# Patient Record
Sex: MALE | Race: WHITE | URBAN - METROPOLITAN AREA
[De-identification: names, ages, dates, MRNs, and addresses within clinical notes are randomized per-mention and may not be internally consistent; named-entity substitution may affect disease eponyms.]

---

## 2020-07-10 ENCOUNTER — OUT OF OFFICE VISIT (OUTPATIENT)
Dept: URBAN - METROPOLITAN AREA MEDICAL CENTER 23 | Facility: MEDICAL CENTER | Age: 75
End: 2020-07-10
Payer: MEDICARE

## 2020-07-10 DIAGNOSIS — D62 ACUTE BLOOD LOSS ANEMIA: ICD-10-CM

## 2020-07-10 DIAGNOSIS — I26.99 PULMONARY EMBOLUS: ICD-10-CM

## 2020-07-10 DIAGNOSIS — Z79.01 ANTICOAGULANT LONG-TERM USE: ICD-10-CM

## 2020-07-10 DIAGNOSIS — K92.1 BLACK STOOL: ICD-10-CM

## 2020-07-10 PROCEDURE — 99222 1ST HOSP IP/OBS MODERATE 55: CPT | Performed by: INTERNAL MEDICINE

## 2020-07-10 PROCEDURE — G8427 DOCREV CUR MEDS BY ELIG CLIN: HCPCS | Performed by: INTERNAL MEDICINE

## 2020-07-11 ENCOUNTER — OUT OF OFFICE VISIT (OUTPATIENT)
Dept: URBAN - METROPOLITAN AREA MEDICAL CENTER 23 | Facility: MEDICAL CENTER | Age: 75
End: 2020-07-11
Payer: MEDICARE

## 2020-07-11 DIAGNOSIS — K92.1 BLACK STOOL: ICD-10-CM

## 2020-07-11 DIAGNOSIS — K55.9 ACUTE ISCHEMIC COLITIS IMPROVING: ICD-10-CM

## 2020-07-11 DIAGNOSIS — D64.89 ANEMIA DUE TO OTHER CAUSE: ICD-10-CM

## 2020-07-11 PROCEDURE — 45380 COLONOSCOPY AND BIOPSY: CPT | Performed by: INTERNAL MEDICINE

## 2020-07-11 PROCEDURE — G9937 DIG OR SURV COLSCO: HCPCS | Performed by: INTERNAL MEDICINE

## 2020-07-11 PROCEDURE — 43235 EGD DIAGNOSTIC BRUSH WASH: CPT | Performed by: INTERNAL MEDICINE

## 2020-07-22 ENCOUNTER — WEB ENCOUNTER (OUTPATIENT)
Dept: URBAN - METROPOLITAN AREA CLINIC 54 | Facility: CLINIC | Age: 75
End: 2020-07-22

## 2020-07-22 ENCOUNTER — OFFICE VISIT (OUTPATIENT)
Dept: URBAN - METROPOLITAN AREA CLINIC 54 | Facility: CLINIC | Age: 75
End: 2020-07-22
Payer: MEDICARE

## 2020-07-22 DIAGNOSIS — Q27.30 AVM (ARTERIOVENOUS MALFORMATION): ICD-10-CM

## 2020-07-22 DIAGNOSIS — K64.9 HEMORRHOIDS: ICD-10-CM

## 2020-07-22 DIAGNOSIS — I26.99 PULMONARY EMBOLISM: ICD-10-CM

## 2020-07-22 DIAGNOSIS — Q25.3 AORTIC STENOSIS: ICD-10-CM

## 2020-07-22 DIAGNOSIS — I25.9 CAD (CORONARY ARTERY DISEASE): ICD-10-CM

## 2020-07-22 DIAGNOSIS — I50.9 CHF (CONGESTIVE HEART FAILURE): ICD-10-CM

## 2020-07-22 DIAGNOSIS — K55.9 ISCHEMIC COLITIS: ICD-10-CM

## 2020-07-22 DIAGNOSIS — D50.9 IRON DEFICIENCY ANEMIA: ICD-10-CM

## 2020-07-22 DIAGNOSIS — K92.1 MELENA: ICD-10-CM

## 2020-07-22 DIAGNOSIS — K57.90 DIVERTICULOSIS: ICD-10-CM

## 2020-07-22 PROCEDURE — 99214 OFFICE O/P EST MOD 30 MIN: CPT | Performed by: INTERNAL MEDICINE

## 2020-07-22 RX ORDER — ATORVASTATIN CALCIUM 40 MG/1
1 TABLET TABLET, FILM COATED ORAL ONCE A DAY
Status: ACTIVE | COMMUNITY

## 2020-07-22 RX ORDER — PANTOPRAZOLE SODIUM 40 MG/1
1 TABLET TABLET, DELAYED RELEASE ORAL ONCE A DAY
Status: ACTIVE | COMMUNITY

## 2020-07-22 RX ORDER — SIMVASTATIN 10 MG/1
TAKE 1 TABLET (10 MG) BY ORAL ROUTE ONCE DAILY IN THE EVENING TABLET, FILM COATED ORAL 1
Qty: 0 | Refills: 0 | Status: DISCONTINUED | COMMUNITY
Start: 1900-01-01

## 2020-07-22 RX ORDER — FERRIC CARBOXYMALTOSE INJECTION 50 MG/ML
AS DIRECTED INJECTION, SOLUTION INTRAVENOUS
Qty: 1 | Refills: 0 | OUTPATIENT
Start: 2020-07-22

## 2020-07-22 RX ORDER — CARVEDILOL 3.12 MG/1
1 TABLET WITH FOOD TABLET, FILM COATED ORAL TWICE A DAY
Status: ACTIVE | COMMUNITY

## 2020-07-22 RX ORDER — APIXABAN 5 MG/1
AS DIRECTED TABLET, FILM COATED ORAL
Status: ACTIVE | COMMUNITY

## 2020-07-22 NOTE — HPI-TODAY'S VISIT:
This is a 74-year-old white man who presents for post hospital dc follow up. He presented with a one-week history of melena and weakness.  Hemoglobin  dropped from 9.7 to 7.2 g/dL.  He denied any chest pain but had mild shortness of breath.  Previous EGD with push enteroscopy in May 2020  revealed a gastric AVM which was ablated with APC. During that admission he underwent a CABG and aortic valve replacement (bioprosthetic valve).  His postoperative course was complicated by pulmonary embolism requiring Eliquis for the past 1 month.  He denies hematuria, easy bleeding, easy bruising.  He was received in a hemodynamically stable state with no signs of active GI bleeding. Last colonoscopy was September 2019 revealing mild sigmoid diverticulosis and large internal hemorrhoids.  He was transfused 2 units of packed red blood cells. EGD was normal. Colonoscopy showed mild right sided ischemic colitis. He reports resolution of melena. Hb is in 9 range. He is iron deficient. No new complaints.

## 2020-07-23 LAB
FERRITIN, SERUM: 31
HEMATOCRIT: 30.9
HEMOGLOBIN: 9.1
IRON BIND.CAP.(TIBC): 366
IRON SATURATION: 5
IRON: 18
MCH: 24.1
MCHC: 29.4
MCV: 82
NRBC: (no result)
PLATELETS: 379
RBC: 3.78
RDW: 17
UIBC: 348
WBC: 6.8

## 2020-07-29 ENCOUNTER — LAB OUTSIDE AN ENCOUNTER (OUTPATIENT)
Dept: URBAN - METROPOLITAN AREA CLINIC 54 | Facility: CLINIC | Age: 75
End: 2020-07-29

## 2022-08-09 ENCOUNTER — APPOINTMENT (RX ONLY)
Dept: URBAN - METROPOLITAN AREA OTHER 13 | Facility: OTHER | Age: 77
Setting detail: DERMATOLOGY
End: 2022-08-09

## 2022-08-09 PROBLEM — C44.219 BASAL CELL CARCINOMA OF SKIN OF LEFT EAR AND EXTERNAL AURICULAR CANAL: Status: ACTIVE | Noted: 2022-08-09

## 2022-08-09 PROCEDURE — ? PRESCRIPTION

## 2022-08-09 PROCEDURE — ? MOHS SURGERY

## 2022-08-09 PROCEDURE — 12051 INTMD RPR FACE/MM 2.5 CM/<: CPT

## 2022-08-09 PROCEDURE — 17311 MOHS 1 STAGE H/N/HF/G: CPT

## 2022-08-09 RX ORDER — MUPIROCIN 20 MG/G
OINTMENT TOPICAL
Qty: 22 | Refills: 0 | COMMUNITY
Start: 2022-08-09

## 2022-08-09 RX ORDER — CEPHALEXIN 500 MG/1
CAPSULE ORAL TID
Qty: 15 | Refills: 0 | COMMUNITY
Start: 2022-08-09

## 2022-08-09 RX ADMIN — CEPHALEXIN: 500 CAPSULE ORAL at 00:00

## 2022-08-09 RX ADMIN — MUPIROCIN: 20 OINTMENT TOPICAL at 00:00

## 2022-08-09 NOTE — HPI: SKIN LESION (BASAL CELL CARCINOMA)
Is This A New Presentation, Or A Follow-Up?: Basal Cell Carcinoma
Additional History:  referral
When Was Basal Cell Biopsied? (Optional): 5/23/22
Accession # (Optional): [id number]

## 2022-08-24 ENCOUNTER — APPOINTMENT (RX ONLY)
Dept: URBAN - METROPOLITAN AREA OTHER 13 | Facility: OTHER | Age: 77
Setting detail: DERMATOLOGY
End: 2022-08-24

## 2022-08-24 PROBLEM — C44.219 BASAL CELL CARCINOMA OF SKIN OF LEFT EAR AND EXTERNAL AURICULAR CANAL: Status: ACTIVE | Noted: 2022-08-24

## 2022-08-24 PROCEDURE — ? SUTURE REMOVAL (GLOBAL PERIOD)

## 2022-08-24 NOTE — PROCEDURE: SUTURE REMOVAL (GLOBAL PERIOD)
Detail Level: Detailed
Add 06751 Cpt? (Important Note: In 2017 The Use Of 61918 Is Being Tracked By Cms To Determine Future Global Period Reimbursement For Global Periods): no

## 2023-05-19 ENCOUNTER — DASHBOARD ENCOUNTERS (OUTPATIENT)
Age: 78
End: 2023-05-19

## 2023-05-19 ENCOUNTER — WEB ENCOUNTER (OUTPATIENT)
Dept: URBAN - METROPOLITAN AREA CLINIC 54 | Facility: CLINIC | Age: 78
End: 2023-05-19

## 2023-05-19 ENCOUNTER — OFFICE VISIT (OUTPATIENT)
Dept: URBAN - METROPOLITAN AREA CLINIC 54 | Facility: CLINIC | Age: 78
End: 2023-05-19
Payer: MEDICARE

## 2023-05-19 VITALS
DIASTOLIC BLOOD PRESSURE: 79 MMHG | TEMPERATURE: 97.3 F | BODY MASS INDEX: 23.39 KG/M2 | HEART RATE: 60 BPM | WEIGHT: 149 LBS | SYSTOLIC BLOOD PRESSURE: 131 MMHG | HEIGHT: 67 IN

## 2023-05-19 DIAGNOSIS — I50.9 CHF (CONGESTIVE HEART FAILURE): ICD-10-CM

## 2023-05-19 DIAGNOSIS — K92.1 MELENA: ICD-10-CM

## 2023-05-19 DIAGNOSIS — K64.9 HEMORRHOIDS: ICD-10-CM

## 2023-05-19 DIAGNOSIS — I25.9 CAD (CORONARY ARTERY DISEASE): ICD-10-CM

## 2023-05-19 DIAGNOSIS — I26.99 PULMONARY EMBOLISM: ICD-10-CM

## 2023-05-19 DIAGNOSIS — Q25.3 AORTIC STENOSIS: ICD-10-CM

## 2023-05-19 DIAGNOSIS — D50.9 IRON DEFICIENCY ANEMIA: ICD-10-CM

## 2023-05-19 DIAGNOSIS — K55.9 ISCHEMIC COLITIS: ICD-10-CM

## 2023-05-19 DIAGNOSIS — K57.90 DIVERTICULOSIS: ICD-10-CM

## 2023-05-19 DIAGNOSIS — Q27.30 AVM (ARTERIOVENOUS MALFORMATION): ICD-10-CM

## 2023-05-19 PROCEDURE — 99214 OFFICE O/P EST MOD 30 MIN: CPT | Performed by: INTERNAL MEDICINE

## 2023-05-19 RX ORDER — ATORVASTATIN CALCIUM 40 MG/1
1 TABLET TABLET, FILM COATED ORAL ONCE A DAY
Status: ACTIVE | COMMUNITY

## 2023-05-19 RX ORDER — CARVEDILOL 6.25 MG/1
1 TABLET WITH FOOD TABLET, FILM COATED ORAL TWICE A DAY
Status: ACTIVE | COMMUNITY

## 2023-05-19 RX ORDER — OMEPRAZOLE 40 MG/1
1 CAPSULE 30 MINUTES BEFORE MORNING MEAL CAPSULE, DELAYED RELEASE ORAL ONCE A DAY
Status: ACTIVE | COMMUNITY

## 2023-05-19 NOTE — HPI-TODAY'S VISIT:
This is a 74-year-old white man who presents for post hospital dc follow up. He presented with a one-week history of melena and weakness.  Hemoglobin  dropped from 9.7 to 7.2 g/dL.  He denied any chest pain but had mild shortness of breath.  Previous EGD with push enteroscopy in May 2020  revealed a gastric AVM which was ablated with APC. During that admission he underwent a CABG and aortic valve replacement (bioprosthetic valve).  His postoperative course was complicated by pulmonary embolism requiring Eliquis for the past 1 month.  He denies hematuria, easy bleeding, easy bruising.  He was received in a hemodynamically stable state with no signs of active GI bleeding. Last colonoscopy was September 2019 revealing mild sigmoid diverticulosis and large internal hemorrhoids.  He was transfused 2 units of packed red blood cells. EGD was normal. Colonoscopy showed mild right sided ischemic colitis. He reports resolution of melena. Hb is in 9 range. He is iron deficient. No new complaints.  Follow Up 5/19/23: Patient presents for routine follow up. He was recently noted to have Hb of 13.3 gm/dl with Ferritin of 21. He denies overt GI or  bleeding. He has not had stool occult blood testing done. He started oral iron therapy but discontinued due to constipation. He denies use of NSAID's or stronger blood thinners. He is otherwise in fairly good health. EGD/push in May 2020 and EGD in July 2020 did not reveal Rossi's esophagus. Colonoscopy in July 2020 revealed mild right sided ischemic colitis.

## 2023-05-20 LAB
FERRITIN, SERUM: 16
FOLATE (FOLIC ACID), SERUM: 10.7
HEMATOCRIT: 41
HEMOGLOBIN: 14.2
IRON BIND.CAP.(TIBC): 377
IRON SATURATION: 29
IRON: 108
MCH: 29.4
MCHC: 34.6
MCV: 84.9
MPV: 10.6
PLATELET COUNT: 191
RDW: 14.1
RED BLOOD CELL COUNT: 4.83
VITAMIN B12: 332
WHITE BLOOD CELL COUNT: 6.6

## 2023-07-10 NOTE — PHYSICAL EXAM MUSCULOSKELETAL:
normal gait and station , no tenderness or deformities present Debridement Text: The wound edges were debrided prior to proceeding with the closure to facilitate wound healing.